# Patient Record
Sex: MALE | Race: WHITE
[De-identification: names, ages, dates, MRNs, and addresses within clinical notes are randomized per-mention and may not be internally consistent; named-entity substitution may affect disease eponyms.]

---

## 2018-08-27 ENCOUNTER — HOSPITAL ENCOUNTER (EMERGENCY)
Dept: HOSPITAL 68 - ERH | Age: 53
End: 2018-08-27
Payer: COMMERCIAL

## 2018-08-27 VITALS — DIASTOLIC BLOOD PRESSURE: 88 MMHG | SYSTOLIC BLOOD PRESSURE: 142 MMHG

## 2018-08-27 VITALS — HEIGHT: 67 IN | WEIGHT: 175 LBS | BODY MASS INDEX: 27.47 KG/M2

## 2018-08-27 DIAGNOSIS — T36.0X5A: ICD-10-CM

## 2018-08-27 DIAGNOSIS — L50.0: Primary | ICD-10-CM

## 2018-08-27 NOTE — ED SKIN/ALLERGY COMPLAINT
History of Present Illness
 
General
Chief Complaint: Allergy Symptoms
Stated Complaint: BELIEVES HE IS HAVING AN ALLERGIC REACTION
Source: patient
Exam Limitations: no limitations
 
Vital Signs & Intake/Output
Vital Signs & Intake/Output
 Vital Signs
 
 
Date Time Temp Pulse Resp B/P B/P Pulse O2 O2 Flow FiO2
 
     Mean Ox Delivery Rate 
 
08/27 0306       Room Air  
 
08/27 0256 98.2 89 18 165/106  97 Room Air  
 
 
 
Allergies
Coded Allergies:
No Known Drug Allergies (NKDA 08/27/18)
 
Reconcile Medications
Prednisone 50 MG TABLET   1 TAB PO DAILY allergy
 
Triage Nurses Notes Reviewed? yes
Onset: Gradual
Duration: hour(s):
Timing: recent history
Severity: moderate
Location: generalized
Possible Factors: on augmentin and ciprodex
Modifying Factors:
Worsens With: scratching. 
Associated Symptoms: rash on trunk
HPI:
54 yo gentleman
presents with diffuse itching and raised red rash for the past 2 hours.
 
He has been taking augmentin for the past 1-2 days for an ear infection, last 
dose was yesterday.  He notes it also upset his stomach.
 
He has no dyspnea, lightheadedness, chills, palpitations.
 
He is otherwise well. 
 
Past History
 
Medical History
Any Pertinent Medical History? see below for history
Cardiovascular: hypertension
 
Surgical History
Surgical History: non-contributory
 
Psychosocial History
What is your primary language English
 
Family History
Hx Contributory? No
 
Review of Systems
 
Review of Systems
Constitutional:
Reports: no symptoms. 
EENTM:
Reports: no symptoms. 
Respiratory:
Reports: no symptoms. 
Cardiovascular:
Reports: no symptoms. 
GI:
Reports: no symptoms. 
Genitourinary:
Reports: no symptoms. 
Musculoskeletal:
Reports: no symptoms. 
Skin:
Reports: no symptoms. 
Neurological/Psychological:
Reports: no symptoms. 
Hematologic/Endocrine:
Reports: no symptoms. 
Immunologic/Allergic:
Reports: no symptoms. 
All Other Systems: Reviewed and Negative
 
Physical Exam
 
Physical Exam
General Appearance: well developed/nourished, mild distress
Head: atraumatic
Eyes:
Bilateral: normal appearance. 
Ears, Nose, Throat: normal pharynx, normal ENT inspection, hearing grossly 
normal, except for mild inflammation in left ear canal. 
Neck: normal inspection, supple
Respiratory: normal breath sounds, chest non-tender, no respiratory distress, 
quiet respiration, lungs clear
Cardiovascular: regular rate/rhythm
Gastrointestinal: soft, non-tender
Back: normal inspection
Extremities: normal inspection, normal range of motion, no edema
Neurologic/Psych: awake, alert, oriented x 3, normal mood/affect
Skin: diffuse urticaria on trunk, swelling of right upper lip. 
Lymphatic: no anterior cervical alexandre
 
Progress
Differential Diagnosis: allergic reaction, hives vs other. 
Plan of Care:
 Current Medications
 
 
  Sig/Jeremy Start time  Last
 
Medication Dose  Stop Time Status Admin
 
Dexamethasone 8 MG ONCE ONE 08/27 0300 UNVr 
 
(Decadron)   08/27 0301  
 
Diphenhydramine HCl 25 MG ONCE ONE 08/27 0300 UNVr 
 
(Benadryl)   08/27 0301  
 
 
 
 
Departure
 
Departure
Disposition: HOME OR SELF CARE
Condition: Stable
Clinical Impression
Primary Impression: Allergic reaction
Secondary Impressions: Urticaria
Referrals:
Unknown
 
Departure Forms:
Customer Survey
General Discharge Information
Prescriptions:
Current Visit Scripts
Prednisone 1 TAB PO DAILY  
     #3 TAB 
 
 
Comments
8/27/18, 4:09am... pt feeling better with reduction in his symptoms.  discussed 
at length.  he will refrain from penicillins from now on.

## 2018-09-10 ENCOUNTER — HOSPITAL ENCOUNTER (EMERGENCY)
Dept: HOSPITAL 68 - ERH | Age: 53
End: 2018-09-10
Payer: COMMERCIAL

## 2018-09-10 VITALS — BODY MASS INDEX: 27.47 KG/M2 | HEIGHT: 67 IN | WEIGHT: 175 LBS

## 2018-09-10 VITALS — SYSTOLIC BLOOD PRESSURE: 169 MMHG | DIASTOLIC BLOOD PRESSURE: 83 MMHG

## 2018-09-10 DIAGNOSIS — H92.02: Primary | ICD-10-CM

## 2018-09-10 NOTE — ED EAR COMPLAINT
History of Present Illness
 
General
Chief Complaint: Ear Complaints
Stated Complaint: EAR PAIN/BLOCKAGE
Source: patient
Exam Limitations: no limitations
 
Vital Signs & Intake/Output
Vital Signs & Intake/Output
 Vital Signs
 
 
Date Time Temp Pulse Resp B/P B/P Pulse O2 O2 Flow FiO2
 
     Mean Ox Delivery Rate 
 
09/10 1656 97.0 55 18 169/83  98 Room Air  
 
 
 
Allergies
Coded Allergies:
Penicillins (Intermediate, RASH 08/27/18)
amoxicillin (Intermediate, LIP SWELLING, HIVES 09/10/18)
 
Reconcile Medications
Neomycin/Polymyxin B Sulf/Hc (Neomycin-Polymyxin-Hc Ear Susp) 3.5 MG/ML-10,000 
UNIT/ML-1 % DROPS.SUSP   4 GTT OT TID OTITIS
Prednisone 50 MG TABLET   1 TAB PO DAILY allergy
 
Triage Note:
53M WITH LEFT EAR PAIN/MUFFLING FROM INFECTION X3
WEEKS, SAW PMD WHO PRESCRIBED EAR GTT AND REFERRED
HIM TO ENT BUT CANT GET IN UNTIL NEXT WEEK. DENIES
PAIN, SEVERE MUFFLING. INCREASING SINUS CONGESTION
ISSUES.
Triage Nurses Notes Reviewed? yes
Onset: Gradual
Duration: week(s):
Timing: recent history
Injury Environment: home
Severity: moderate
HPI:
53-year-old male presents to emergency department complaining of persistent pain
and clogged sensation in left ear 2.5 weeks.  Patient states that he was 
initially seen in urgent care and started on amoxicillin however after he began 
this medication he developed a rash.  Patient presented to the emergency 
department following the rash was medicated with Benadryl, prednisone.  Patient 
was also taking Ciprodex eardrops.  Patient has followed up with his primary 
care doctor since and was instructed to discontinue ear drops, he has been off 
the drops for about 1 week.  Patient states that pain has been persistent for 
the full duration and he also has sensation that the ear is clogged, he states 
his hearing feels 80% reduced compared to baseline. Primary Care doctor set the 
patient up with an ENT specialist however he does not an appointment until 1 
week from today.  Patient reports left sided pressure to his face as well.
(Renita AQUINO,Sadie Davila)
 
Past History
 
Travel History
Traveled to [place] past 21 day No
 
Medical History
Any Pertinent Medical History? see below for history
Neurological: NONE
EENT: NONE
Cardiovascular: hypertension
Respiratory: NONE
Gastrointestinal: NONE
Hepatic: NONE
Renal: NONE
Musculoskeletal: NONE
Psychiatric: NONE
Endocrine: NONE
 
Surgical History
Surgical History: non-contributory
 
Psychosocial History
What is your primary language English
Tobacco Use: Never used
 
Family History
Hx Contributory? No
(Sadie Matta)
 
Review of Systems
 
Review of Systems
Constitutional:
Reports: no symptoms. 
EENTM:
Reports: see HPI. 
Respiratory:
Reports: no symptoms. 
Cardiovascular:
Reports: no symptoms. 
GI:
Reports: no symptoms. 
Genitourinary:
Reports: no symptoms. 
Musculoskeletal:
Reports: no symptoms. 
Skin:
Reports: no symptoms. 
Neurological/Psychological:
Reports: no symptoms. 
Hematologic/Endocrine:
Reports: no symptoms. 
Immunologic/Allergic:
Reports: no symptoms. 
All Other Systems: Reviewed and Negative
(Sadie Matta)
 
Physical Exam
 
Physical Exam
General Appearance: well developed/nourished, no apparent distress, alert, awake
Head: atraumatic, normal appearance
Eyes:
Bilateral: normal appearance. 
Ears:
Left: discharge, other.  Right: canal normal, Tympanic normal. 
Nose: normal inspection
Mouth/Throat: normal mouth inspection, pharynx normal
Neck: normal inspection, supple, full range of motion
Cardiovascular/Respiratory: no respiratory distress
Back: normal inspection, normal range of motion
Neurologic/Psych: awake, alert, oriented x 3
Skin: intact, normal color, warm/dry
(Sadie Matta)
 
Progress
Differential Diagnoses
I considered the following diagnoses in my evaluation of the patient: [Otitis 
media, otitis externa, tympanic membrane abnormality, sinusitis, pharyngitis]
 
Plan of Care:
Patient has white discharge present in the left ear canal at this time.  He does
not have cerumen impaction.  Findings are most consistent with otitis externa.  
Patient does have an appointment scheduled with ENT for next week.  Will 
initiate a new eardrops and outpatient follow-up as scheduled.  We'll also 
initiate nasal spray to help with the patient's left-sided facial pressure.  
Patient agrees with the plan of care.  He is in no acute distress, nontoxic 
appearing, vital signs are stable.
Initial ED EKG: none
(Sadie Matta)
 
Departure
 
Departure
Disposition: HOME OR SELF CARE
Condition: Stable
Clinical Impression
Primary Impression: Ear pain
Qualifiers:  Laterality: left Qualified Code: H92.02 - Otalgia, left ear
Referrals:
Crystal Dill (PCP/Family)
 
Additional Instructions:
Use ear drops as prescribed. Also begin flonase nasal spray for sinus pressure. 
Follow up with Ear nose and throat doctor. Return with worsening symptoms or 
concerns.
 
Please note that there might be incidental findings in your evaluation that are 
unrelated to the current emergency department visit.  Please notify your primary
care doctor about this emergency department visit in order to obtain and review 
all of the testing performed so that these incidental findings can be monitored 
as needed.
 
If you had an x-ray performed, please understand that some fractures may not be 
seen on the initial set of x-rays.  If your symptoms persist you might need a 
repeat set of x-rays to check for such a fracture.
 
If you had a laceration evaluated, please understand that foreign bodies such as
glass or wood may not be visible to the naked eye or on plain x-rays.  If the 
wound becomes red, swollen, increasingly more painful or if there is any 
drainage from the wound, please have it reevaluated by a physician for the 
possibility of a retained foreign body.
 
If you're unable to follow up as outlined in the discharge instructions please 
return to the emergency department.
 
Thank you for choosing the [facility] Emergency Department for your care.
It was a pleasure to serve you today.
Departure Forms:
Customer Survey
General Discharge Information
Prescriptions:
Current Visit Scripts
Neomycin/Polymyxin B Sulf/Hc (Neomycin-Polymyxin-Hc Ear Susp) 4 GTT OT TID  
     #10 ML 
 
 
(Renita AQUINO,Sadie Davila)
 
PA/NP Co-Sign Statement
Statement:
ED Attending supervision documentation-
 
[] I saw and evaluated the patient. I have also reviewed all the pertinent lab 
results and diagnostic results. I agree with the findings and the plan of care 
as documented in the PA's/NP's documentation. 
 
[X] I have reviewed the ED Record and agree with the PA's/NP's documentation.
 
[] Additions or exceptions (if any) to the PAs/NP's note and plan are 
summarized below:
[]
 
(Lynn MEZA,Frank RAZO)